# Patient Record
Sex: FEMALE | Race: OTHER
[De-identification: names, ages, dates, MRNs, and addresses within clinical notes are randomized per-mention and may not be internally consistent; named-entity substitution may affect disease eponyms.]

---

## 2019-04-02 ENCOUNTER — HOSPITAL ENCOUNTER (OUTPATIENT)
Dept: HOSPITAL 61 - MRI | Age: 39
Discharge: HOME | End: 2019-04-02
Attending: PHYSICIAN ASSISTANT
Payer: COMMERCIAL

## 2019-04-02 DIAGNOSIS — M94.262: ICD-10-CM

## 2019-04-02 DIAGNOSIS — R79.82: ICD-10-CM

## 2019-04-02 DIAGNOSIS — M17.12: Primary | ICD-10-CM

## 2019-04-02 PROCEDURE — 73721 MRI JNT OF LWR EXTRE W/O DYE: CPT

## 2019-04-02 NOTE — RAD
MR of the left knee

 

HISTORY: Posterior left knee pain during flexion for 8 months.

 

TECHNIQUE: Routine multiplanar sequences are obtained.

 

FINDINGS:

No evidence of medial meniscal tear. No evidence of lateral meniscal tear.

The anterior and posterior cruciate ligaments are intact. Medial 

collateral ligament intact. Iliotibial band unremarkable. Fibular 

collateral ligament, biceps femoris tendon and popliteus tendon are 

intact. Extensor mechanism intact.

 

Trace joint fluid. Moderate chondromalacia at the medial and lateral joint

compartments, moderate to severe chondromalacia at the patellofemoral 

joint. No acute fracture or aggressive bone destruction. No significant 

Baker's cyst.

 

IMPRESSION:

1. Primary osteoarthritis.

2. No evidence of meniscal tear or other internal derangement.

 

Electronically signed by: Simeon Mtz MD (4/2/2019 12:36 PM) [id number]